# Patient Record
Sex: MALE | Race: WHITE
[De-identification: names, ages, dates, MRNs, and addresses within clinical notes are randomized per-mention and may not be internally consistent; named-entity substitution may affect disease eponyms.]

---

## 2018-07-16 ENCOUNTER — HOSPITAL ENCOUNTER (OUTPATIENT)
Dept: HOSPITAL 95 - ORSCSDS | Age: 23
Discharge: HOME | End: 2018-07-16
Attending: INTERNAL MEDICINE
Payer: COMMERCIAL

## 2018-07-16 VITALS — BODY MASS INDEX: 31.16 KG/M2 | WEIGHT: 222.6 LBS | HEIGHT: 70.98 IN

## 2018-07-16 DIAGNOSIS — J45.909: ICD-10-CM

## 2018-07-16 DIAGNOSIS — K20.9: ICD-10-CM

## 2018-07-16 DIAGNOSIS — F41.9: ICD-10-CM

## 2018-07-16 DIAGNOSIS — R19.7: Primary | ICD-10-CM

## 2018-07-16 DIAGNOSIS — R10.9: ICD-10-CM

## 2018-07-16 DIAGNOSIS — Z79.899: ICD-10-CM

## 2018-07-16 DIAGNOSIS — K63.89: ICD-10-CM

## 2018-07-16 DIAGNOSIS — I10: ICD-10-CM

## 2018-07-16 PROCEDURE — 0DBE8ZX EXCISION OF LARGE INTESTINE, VIA NATURAL OR ARTIFICIAL OPENING ENDOSCOPIC, DIAGNOSTIC: ICD-10-PCS | Performed by: INTERNAL MEDICINE

## 2018-07-16 PROCEDURE — 0DB58ZX EXCISION OF ESOPHAGUS, VIA NATURAL OR ARTIFICIAL OPENING ENDOSCOPIC, DIAGNOSTIC: ICD-10-PCS | Performed by: INTERNAL MEDICINE

## 2018-07-16 PROCEDURE — 0DB88ZX EXCISION OF SMALL INTESTINE, VIA NATURAL OR ARTIFICIAL OPENING ENDOSCOPIC, DIAGNOSTIC: ICD-10-PCS | Performed by: INTERNAL MEDICINE

## 2018-07-16 PROCEDURE — 0DB68ZX EXCISION OF STOMACH, VIA NATURAL OR ARTIFICIAL OPENING ENDOSCOPIC, DIAGNOSTIC: ICD-10-PCS | Performed by: INTERNAL MEDICINE

## 2023-02-22 NOTE — NUR
02/22/23 0816 [facility]
0810: INJECTION BY DR WILLIAM OF 10 CC 2% LIDOCAINE WITH EPINEPHRINE
1:100,000. TIMEOUT COMPLETED. INJECTION TO L WRIST.